# Patient Record
Sex: MALE | Race: WHITE | URBAN - METROPOLITAN AREA
[De-identification: names, ages, dates, MRNs, and addresses within clinical notes are randomized per-mention and may not be internally consistent; named-entity substitution may affect disease eponyms.]

---

## 2017-10-03 ENCOUNTER — OFFICE VISIT (OUTPATIENT)
Dept: NEUROLOGY | Facility: CLINIC | Age: 22
End: 2017-10-03

## 2017-10-03 ENCOUNTER — TELEPHONE (OUTPATIENT)
Dept: NEUROLOGY | Facility: CLINIC | Age: 22
End: 2017-10-03

## 2017-10-03 VITALS
HEART RATE: 64 BPM | DIASTOLIC BLOOD PRESSURE: 80 MMHG | RESPIRATION RATE: 16 BRPM | HEIGHT: 67 IN | SYSTOLIC BLOOD PRESSURE: 124 MMHG

## 2017-10-03 DIAGNOSIS — R25.1 TREMOR: Primary | ICD-10-CM

## 2017-10-03 DIAGNOSIS — F41.9 ANXIETY: ICD-10-CM

## 2017-10-03 PROCEDURE — 99204 OFFICE O/P NEW MOD 45 MIN: CPT | Performed by: OTHER

## 2017-10-03 NOTE — PROGRESS NOTES
Neurology Outpatient Initial Note    Oscar Solomon : 1995   HPI:     Oscar Solomon is a 24year old male who is being seen in neurologic evaluation. He is being seen in evaluation for tremor.   He is accompanied by his mom to the visit t Other Topics            Concern  Caffeine Concern        Yes    Comment:coffee; 1 cup 4-5 times per week   Exercise                Yes          ROS:   GENERAL: no fevers, no chills  SKIN: no new lesions or rashes  EYES: no vision loss, no eye pain  RESPI this is the priority problem to treat right now (see below)    –We will obtain blood work for TSH, copper, ceruloplasmin levels    –We will obtain brain MRI to evaluate for caudate atrophy, particularly given extensive family history of psychiatric illness

## 2017-10-03 NOTE — TELEPHONE ENCOUNTER
[employer] for authorization of approval for MRI brain wo. Approval was given with  Authorization # [id number] effective 10/30/17 to 11/01/17. Will call Pt. to inform. Pt. Informed of approval. He will call later to schedule MRI appt.

## 2017-10-10 ENCOUNTER — TELEPHONE (OUTPATIENT)
Dept: NEUROLOGY | Facility: CLINIC | Age: 22
End: 2017-10-10

## 2017-10-10 NOTE — TELEPHONE ENCOUNTER
MRI brain report received and placed in nurses inbox for review.  Dr Chrystal Carlson is on call this week

## 2017-10-12 NOTE — TELEPHONE ENCOUNTER
Reports of Brain MRI, done at [address] [place] [address] on 10/09/17, was received in office.  Dr. Ari Schofield is not in the office this week, due to covering [facility]. The report was shown to Dr. Nellie Reynolds, who said that it was fine to leave the report for

## 2017-10-14 DIAGNOSIS — R25.1 TREMOR: Primary | ICD-10-CM

## 2017-10-14 DIAGNOSIS — F41.9 ANXIETY: ICD-10-CM

## 2017-10-14 NOTE — TELEPHONE ENCOUNTER
MRI brain report from [facility] 10/9/2017 reviewed, placed for scanning. Impression: Small area of decreased single intensity noted adjacent to the basilar artery which is of uncertain etiology.   The possibility that this represents a vascular p

## 2017-10-16 ENCOUNTER — TELEPHONE (OUTPATIENT)
Dept: NEUROLOGY | Facility: CLINIC | Age: 22
End: 2017-10-16

## 2017-10-17 ENCOUNTER — TELEPHONE (OUTPATIENT)
Dept: NEUROLOGY | Facility: CLINIC | Age: 22
End: 2017-10-17

## 2017-10-17 DIAGNOSIS — R25.1 TREMOR: Primary | ICD-10-CM

## 2017-10-17 RX ORDER — PROPRANOLOL HYDROCHLORIDE 20 MG/1
20 TABLET ORAL 2 TIMES DAILY
Qty: 60 TABLET | Refills: 3 | Status: SHIPPED | OUTPATIENT
Start: 2017-10-17 | End: 2017-12-18

## 2017-10-17 NOTE — TELEPHONE ENCOUNTER
Rec'd fax from [facility] with Approval for MRA Brain with Authorization Order #[id number] valid until 11/14/2017, will call patient to inform of the Approval, L/M for patient to c/b to schedule or to call Radiology scheduling at [phone]

## 2017-10-17 NOTE — TELEPHONE ENCOUNTER
Patient also says his mood is a lot better, he wants to try the tremor medication. Prescription for propranolol sent, 20 mg twice daily.   I did advise patient of risks including low blood pressure, and recommended that he have his blood pressure checked i

## 2017-10-20 ENCOUNTER — TELEPHONE (OUTPATIENT)
Dept: NEUROLOGY | Facility: CLINIC | Age: 22
End: 2017-10-20

## 2017-10-23 ENCOUNTER — TELEPHONE (OUTPATIENT)
Dept: NEUROLOGY | Facility: CLINIC | Age: 22
End: 2017-10-23

## 2017-10-23 NOTE — TELEPHONE ENCOUNTER
MRA results reviewed, placed for scanning. MRA brain 11/20/2017 normal.    Results letter sent to patient.

## 2017-12-18 RX ORDER — PROPRANOLOL HYDROCHLORIDE 20 MG/1
20 TABLET ORAL 2 TIMES DAILY
Qty: 60 TABLET | Refills: 5 | Status: SHIPPED | OUTPATIENT
Start: 2017-12-18

## 2024-09-10 NOTE — TELEPHONE ENCOUNTER
Doron Mcdermott MD         8:47 AM   Note      MRI brain report from [facility] 10/9/2017 reviewed, placed for scanning.     Impression: Small area of decreased single intensity noted adjacent to the basilar artery which is of uncertain etiology.   Jeremias Nash No care due was identified.  [facility] Embedded Care Due Messages. Reference number: [id number].   9/10/2024 12:24:13 AM CDT